# Patient Record
Sex: FEMALE
[De-identification: names, ages, dates, MRNs, and addresses within clinical notes are randomized per-mention and may not be internally consistent; named-entity substitution may affect disease eponyms.]

---

## 2018-12-17 PROBLEM — Z00.00 ENCOUNTER FOR PREVENTIVE HEALTH EXAMINATION: Status: ACTIVE | Noted: 2018-12-17

## 2019-01-07 ENCOUNTER — APPOINTMENT (OUTPATIENT)
Dept: ENDOCRINOLOGY | Facility: CLINIC | Age: 59
End: 2019-01-07

## 2019-01-22 ENCOUNTER — APPOINTMENT (OUTPATIENT)
Dept: ENDOCRINOLOGY | Facility: CLINIC | Age: 59
End: 2019-01-22

## 2019-01-31 ENCOUNTER — APPOINTMENT (OUTPATIENT)
Dept: ENDOCRINOLOGY | Facility: CLINIC | Age: 59
End: 2019-01-31
Payer: MEDICAID

## 2019-01-31 VITALS
HEIGHT: 61.81 IN | DIASTOLIC BLOOD PRESSURE: 52 MMHG | HEART RATE: 81 BPM | BODY MASS INDEX: 20.61 KG/M2 | WEIGHT: 112 LBS | SYSTOLIC BLOOD PRESSURE: 120 MMHG

## 2019-01-31 DIAGNOSIS — Z83.49 FAMILY HISTORY OF OTHER ENDOCRINE, NUTRITIONAL AND METABOLIC DISEASES: ICD-10-CM

## 2019-01-31 PROCEDURE — 99205 OFFICE O/P NEW HI 60 MIN: CPT

## 2019-01-31 NOTE — ASSESSMENT
[FreeTextEntry1] : 57 y/o F pt dx for graves disease and subsequently developed hypothyroidism over 35 years ago. Pt is clinically euthyroid.\par Recent labs from 12/18/18, TSH .13. \par Order thyroid function test and make a judgement of Levothyroxine dose accordingly. \par \par Pt formally dx with DM insipidus >30 years ago in [facility]. Pt admits to becoming severely symptomatic in the context of having limited to no access to Desmopressin in [facility]. \par \par Order complete metabolic profile today. Recommend to take Desmopressin BID. \par \par Return in 6 months. \par \par  [Levothyroxine] : The patient was instructed to take Levothyroxine on an empty stomach, separate from vitamins, and wait at least 30 minutes before eating

## 2019-01-31 NOTE — HISTORY OF PRESENT ILLNESS
[FreeTextEntry1] : 57 y/o F pt, BMI 20.6, officially moved to the  from [facility] last year, dx with grave disease after delivery approximately 37 years ago. Pt treated with radio iodine ablation and subsequently developed hypothyroidism for which she takes thyroid supplementation. One year after dx of grave's disease, pt was dx with DM insipidus 2/2 having symptoms of polyuria; pt was started on Desmopressin. Pt states that she had a pituitary MRI that reports microadenomas approximately 30 years ago. \par \par Pt is currently prescribed Levothyroxine 100 micrograms and Desmopressin .1 mg TID. However, pt relates she tends to spare the use of her Desmopressin to only take 1 pill at night and she takes 2 or 3 when she is not home. \par \par Upon reviewing lab reports from 12/18/18, TSH .13, LDL-C 131, prolactin 12.4, sodium 141 and Vitamin D 19. \par \par Pt is feeling well with no complaints. Pt denies weight loss, palpitations, cold and heat intolerance, headaches and excessive urination. \par

## 2019-01-31 NOTE — PHYSICAL EXAM
[Alert] : alert [No Acute Distress] : no acute distress [Well Nourished] : well nourished [Well Developed] : well developed [Normal Sclera/Conjunctiva] : normal sclera/conjunctiva [EOMI] : extra ocular movement intact [Normal Oropharynx] : the oropharynx was normal [Thyroid Not Enlarged] : the thyroid was not enlarged [No Thyroid Nodules] : there were no palpable thyroid nodules [No Respiratory Distress] : no respiratory distress [No Accessory Muscle Use] : no accessory muscle use [Clear to Auscultation] : lungs were clear to auscultation bilaterally [Normal Rate] : heart rate was normal  [Normal S1, S2] : normal S1 and S2 [Regular Rhythm] : with a regular rhythm [Pedal Pulses Normal] : the pedal pulses are present [No Edema] : there was no peripheral edema [Normal Bowel Sounds] : normal bowel sounds [Post Cervical Nodes] : posterior cervical nodes [Anterior Cervical Nodes] : anterior cervical nodes [Axillary Nodes] : axillary nodes [Normal] : normal and non tender [No Spinal Tenderness] : no spinal tenderness [Spine Straight] : spine straight [No Stigmata of Cushings Syndrome] : no stigmata of cushings syndrome [Normal Gait] : normal gait [Normal Strength/Tone] : muscle strength and tone were normal [No Rash] : no rash [Acanthosis Nigricans] : no acanthosis nigricans [Normal Reflexes] : deep tendon reflexes were 2+ and symmetric [No Tremors] : no tremors [Oriented x3] : oriented to person, place, and time [de-identified] : mild proptosis

## 2019-02-05 ENCOUNTER — OTHER (OUTPATIENT)
Age: 59
End: 2019-02-05

## 2019-02-05 ENCOUNTER — RESULT CHARGE (OUTPATIENT)
Age: 59
End: 2019-02-05

## 2019-02-05 LAB
25(OH)D3 SERPL-MCNC: 30.4 NG/ML
ALBUMIN SERPL ELPH-MCNC: 4.8 G/DL
ALP BLD-CCNC: 115 U/L
ALT SERPL-CCNC: 13 U/L
ANION GAP SERPL CALC-SCNC: 12 MMOL/L
AST SERPL-CCNC: 21 U/L
BILIRUB SERPL-MCNC: 0.3 MG/DL
BUN SERPL-MCNC: 13 MG/DL
CALCIUM SERPL-MCNC: 9.3 MG/DL
CHLORIDE SERPL-SCNC: 99 MMOL/L
CO2 SERPL-SCNC: 26 MMOL/L
CREAT SERPL-MCNC: 0.52 MG/DL
GLUCOSE SERPL-MCNC: 94 MG/DL
POTASSIUM SERPL-SCNC: 4 MMOL/L
PROT SERPL-MCNC: 7.2 G/DL
SODIUM SERPL-SCNC: 137 MMOL/L
T3FREE SERPL-MCNC: 2.61 PG/ML
T4 FREE SERPL-MCNC: 1.5 NG/DL
THYROGLOB AB SERPL-ACNC: <20 IU/ML
THYROPEROXIDASE AB SERPL IA-ACNC: <10 IU/ML
TSH SERPL-ACNC: 0.14 UIU/ML

## 2019-03-28 ENCOUNTER — APPOINTMENT (OUTPATIENT)
Dept: ENDOCRINOLOGY | Facility: CLINIC | Age: 59
End: 2019-03-28
Payer: MEDICAID

## 2019-03-28 VITALS
SYSTOLIC BLOOD PRESSURE: 125 MMHG | HEART RATE: 81 BPM | WEIGHT: 112 LBS | DIASTOLIC BLOOD PRESSURE: 67 MMHG | BODY MASS INDEX: 20.61 KG/M2

## 2019-03-28 PROCEDURE — 82962 GLUCOSE BLOOD TEST: CPT

## 2019-03-28 PROCEDURE — 99215 OFFICE O/P EST HI 40 MIN: CPT | Mod: 25

## 2019-03-28 NOTE — HISTORY OF PRESENT ILLNESS
[FreeTextEntry1] : 12/18/18, TSH 0.13, LDL-C 131, prolactin 12.4, sodium 141 and Vitamin D 19. \par 1/31/19: TSH 0.14, t4 free 1.5, vit d 25,oh 30.4, sodium 137 \par \par 59 y/o F pt, BMI 20.6, officially moved to the  from [facility] last year, dx with grave disease after delivery approximately 37 years ago. Pt treated with radio iodine ablation and subsequently developed hypothyroidism for which she takes thyroid supplementation. One year after dx of grave's disease, pt was dx with DM insipidus 2/2 having symptoms of polyuria; pt was started on Desmopressin. Pt states that she had a pituitary MRI that reports microadenomas approximately 30 years ago. \par \par Pt presents today stating she feels much better, and a lot more calm. Pt denies weight loss, palpitations, cold and heat intolerance, headaches, visual problems and excessive urination.\par \par Current medications: Desmopressin 1 tablet BID, Levothyroxine 88 mcg (down from 100 in the past)

## 2019-03-28 NOTE — PHYSICAL EXAM
[Alert] : alert [No Acute Distress] : no acute distress [Well Nourished] : well nourished [Well Developed] : well developed [Normal Sclera/Conjunctiva] : normal sclera/conjunctiva [EOMI] : extra ocular movement intact [Normal Oropharynx] : the oropharynx was normal [Thyroid Not Enlarged] : the thyroid was not enlarged [No Thyroid Nodules] : there were no palpable thyroid nodules [No Respiratory Distress] : no respiratory distress [Normal Rate] : heart rate was normal  [Pedal Pulses Normal] : the pedal pulses are present [No Edema] : there was no peripheral edema [Normal Bowel Sounds] : normal bowel sounds [No Spinal Tenderness] : no spinal tenderness [Spine Straight] : spine straight [No Stigmata of Cushings Syndrome] : no stigmata of cushings syndrome [Normal Gait] : normal gait [No Rash] : no rash [Normal Reflexes] : deep tendon reflexes were 2+ and symmetric [No Tremors] : no tremors [Oriented x3] : oriented to person, place, and time [No Neck Mass] : no neck mass was observed [Normal Strength/Tone] : muscle strength and tone were normal [Acanthosis Nigricans] : no acanthosis nigricans [de-identified] : mild proptosis  [de-identified] : no tremors

## 2019-03-28 NOTE — ASSESSMENT
[Levothyroxine] : The patient was instructed to take Levothyroxine on an empty stomach, separate from vitamins, and wait at least 30 minutes before eating [FreeTextEntry1] : 59 y/o F pt with \par 1. DM insipidus \par Pt overall feels much better, less anxious and clinically improved; no longer with urinary frequency or urgency. \par No nocturia. Continue with Desmopressin 0.1mg 1 tablet at 8 am and 1 tablet at 8 pm (down from 3 tablets/day in the past). Labs today. \par \par 2. Hypothyroidism s/p grave's disease ablation\par Pt is euthyroid. Continue with 88 mcg.

## 2019-03-28 NOTE — END OF VISIT
[FreeTextEntry3] : All medical record entries made by the Scribe were at my, Dr. Jose Doherty, direction and personally dictated by me on 03/28/2019. I have reviewed the chart and agree that the record accurately reflects my personal performance of the history, physical exam, assessment and plan. I have also personally directed, reviewed and agreed with the chart. \par \par   [>50% of Time Spent on Counseling for ____] : Greater than 50% of the encounter time was spent on counseling for [unfilled] [Time Spent: ___ minutes] : I have spent [unfilled] minutes of face to face time with the patient

## 2019-03-28 NOTE — ADDENDUM
[FreeTextEntry1] : I, Hannah Quinonez, acted soley as a scribe for Dr. Jose Doherty on this date. 03/28/2019.

## 2019-04-10 LAB
ALBUMIN SERPL ELPH-MCNC: 4.6 G/DL
ALP BLD-CCNC: 125 U/L
ALT SERPL-CCNC: 14 U/L
ANION GAP SERPL CALC-SCNC: 13 MMOL/L
AST SERPL-CCNC: 20 U/L
BILIRUB SERPL-MCNC: <0.2 MG/DL
BUN SERPL-MCNC: 18 MG/DL
CALCIUM SERPL-MCNC: 9.7 MG/DL
CHLORIDE SERPL-SCNC: 99 MMOL/L
CO2 SERPL-SCNC: 28 MMOL/L
CREAT SERPL-MCNC: 0.64 MG/DL
GLUCOSE BLDC GLUCOMTR-MCNC: 112
GLUCOSE SERPL-MCNC: 73 MG/DL
POTASSIUM SERPL-SCNC: 4.2 MMOL/L
PROT SERPL-MCNC: 7.4 G/DL
SODIUM SERPL-SCNC: 140 MMOL/L
T4 FREE SERPL-MCNC: 1.4 NG/DL
TSH SERPL-ACNC: 0.21 UIU/ML

## 2019-07-11 ENCOUNTER — CHART COPY (OUTPATIENT)
Age: 59
End: 2019-07-11

## 2019-07-30 ENCOUNTER — OTHER (OUTPATIENT)
Age: 59
End: 2019-07-30

## 2019-08-13 ENCOUNTER — OTHER (OUTPATIENT)
Age: 59
End: 2019-08-13

## 2019-09-27 ENCOUNTER — OTHER (OUTPATIENT)
Age: 59
End: 2019-09-27

## 2019-10-08 ENCOUNTER — APPOINTMENT (OUTPATIENT)
Dept: ENDOCRINOLOGY | Facility: CLINIC | Age: 59
End: 2019-10-08
Payer: MEDICAID

## 2019-10-08 VITALS
BODY MASS INDEX: 21.53 KG/M2 | WEIGHT: 117 LBS | DIASTOLIC BLOOD PRESSURE: 66 MMHG | HEART RATE: 77 BPM | SYSTOLIC BLOOD PRESSURE: 110 MMHG | HEIGHT: 61.81 IN

## 2019-10-08 LAB — GLUCOSE BLDC GLUCOMTR-MCNC: 89

## 2019-10-08 PROCEDURE — 99215 OFFICE O/P EST HI 40 MIN: CPT | Mod: 25

## 2019-10-08 PROCEDURE — 82962 GLUCOSE BLOOD TEST: CPT

## 2019-10-08 RX ORDER — LEVOTHYROXINE SODIUM 0.09 MG/1
88 TABLET ORAL DAILY
Qty: 90 | Refills: 1 | Status: DISCONTINUED | COMMUNITY
Start: 2019-02-05 | End: 2019-10-08

## 2019-10-10 LAB
ALBUMIN SERPL ELPH-MCNC: 4.5 G/DL
ALP BLD-CCNC: 110 U/L
ALT SERPL-CCNC: 11 U/L
ANION GAP SERPL CALC-SCNC: 10 MMOL/L
AST SERPL-CCNC: 18 U/L
BILIRUB SERPL-MCNC: 0.2 MG/DL
BUN SERPL-MCNC: 15 MG/DL
CALCIUM SERPL-MCNC: 9.5 MG/DL
CHLORIDE SERPL-SCNC: 104 MMOL/L
CO2 SERPL-SCNC: 28 MMOL/L
CREAT SERPL-MCNC: 0.74 MG/DL
GLUCOSE SERPL-MCNC: 93 MG/DL
POTASSIUM SERPL-SCNC: 4.1 MMOL/L
PROT SERPL-MCNC: 6.9 G/DL
SODIUM SERPL-SCNC: 142 MMOL/L
T4 FREE SERPL-MCNC: 1.5 NG/DL
TSH SERPL-ACNC: 0.18 UIU/ML

## 2019-10-10 NOTE — REASON FOR VISIT
[Follow-Up: _____] : a [unfilled] follow-up visit [FreeTextEntry1] : diabetes insipidus, secondary hypothyroidism

## 2019-10-10 NOTE — REVIEW OF SYSTEMS
[As Noted in HPI] : as noted in HPI [Recent Weight Gain (___ Lbs)] : recent [unfilled] ~Ulb weight gain [Negative] : Eyes [Recent Weight Loss (___ Lbs)] : no recent weight loss [Palpitations] : no palpitations [Polyuria] : no polyuria [Nocturia] : no nocturia [FreeTextEntry7] : denies GI disturbances

## 2019-10-10 NOTE — PHYSICAL EXAM
[Alert] : alert [No Acute Distress] : no acute distress [Well Nourished] : well nourished [Well Developed] : well developed [Normal Sclera/Conjunctiva] : normal sclera/conjunctiva [EOMI] : extra ocular movement intact [No Neck Mass] : no neck mass was observed [Thyroid Not Enlarged] : the thyroid was not enlarged [No Thyroid Nodules] : there were no palpable thyroid nodules [No Respiratory Distress] : no respiratory distress [Normal Rate] : heart rate was normal  [Pedal Pulses Normal] : the pedal pulses are present [No Edema] : there was no peripheral edema [Normal Bowel Sounds] : normal bowel sounds [Spine Straight] : spine straight [No Stigmata of Cushings Syndrome] : no stigmata of cushings syndrome [Normal Gait] : normal gait [Normal Strength/Tone] : muscle strength and tone were normal [No Rash] : no rash [Normal Reflexes] : deep tendon reflexes were 2+ and symmetric [No Tremors] : no tremors [Oriented x3] : oriented to person, place, and time [Normal Outer Ear/Nose] : the ears and nose were normal in appearance [Acanthosis Nigricans] : no acanthosis nigricans [de-identified] : mild proptosis  [de-identified] : no tremors

## 2019-10-10 NOTE — ASSESSMENT
[Levothyroxine] : The patient was instructed to take Levothyroxine on an empty stomach, separate from vitamins, and wait at least 30 minutes before eating [FreeTextEntry1] : 58 y/o F pt with: \par 1. Hx of hypothyroidism:\par Pt is clinically euthyroid. On 88 mcg of Levothyroxine. \par Will send for TFTs today.\par \par 2. Hx of DM insipidus (dx > 37 yrs ago):\par Pt is on Desmopressin 0.1mg 1 tablet at 8AM and 8PM; she does not have symptoms of polyuria. Recommend pt continue present management. \par \par Return in 4 months. \par \par

## 2019-10-10 NOTE — ADDENDUM
[FreeTextEntry1] : I, Alisa Richardson, acted solely as a scribe for Dr. Jose Doherty on this date. 10/08/2019.

## 2019-10-10 NOTE — END OF VISIT
[FreeTextEntry3] : All medical record entries made by the Scribe were at my, Dr. Jose Doherty, direction and personally dictated by me on 10/08/2019. I have reviewed the chart and agree that the record accurately reflects my personal performance of the history, physical exam, assessment and plan. I have also personally directed, reviewed and agreed with the chart.  [>50% of Time Spent on Counseling for ____] : Greater than 50% of the encounter time was spent on counseling for [unfilled] [Time Spent: ___ minutes] : I have spent [unfilled] minutes of face to face time with the patient

## 2019-10-10 NOTE — HISTORY OF PRESENT ILLNESS
[FreeTextEntry1] : 12/18/18, TSH 0.13, LDL-C 131, prolactin 12.4, sodium 141 and Vitamin D 19. \par 1/31/19: TSH 0.14, t4 free 1.5, vit d 25,oh 30.4, sodium 137 \par 3/28/19: TSH 0.21, Free T4 1.4, s. creat 0.64, Na 140, \par \par 58 y/o F pt with Hx of secondary hypothyroidism 2/2 Grave's disease (dx post delivery ~37 yrs ago; treated by MAURICIO ablation).\par Significant PMHx: DM insipidus (dx 1yr after Grave's disease diagnosis) and microadenoma (dx ~30 yrs ago, as per pt)\par Last PCP visit: in 2018\par \par Pt officially moved to the  from [facility] in 2018, dx with grave disease after delivery approximately 37 years ago. Pt treated with radio iodine ablation and subsequently developed hypothyroidism for which she takes thyroid supplementation. One year after dx of grave's disease, pt was dx with DM insipidus 2/2 having symptoms of polyuria; pt was started on Desmopressin. Pt states that she had a pituitary MRI that reports microadenomas approximately 30 years ago. \par \par 3/28/19\par Pt presents today stating she feels much better, and a lot more calm. Pt denies weight loss, palpitations, cold and heat intolerance, headaches, visual problems and excessive urination.\par \par 10/8/19\par Today pt presents for thyroid f/u, feeling well with no major physical complaints. She notes she moved from [facility].  \par Pt states she takes Desmopressin at 8AM and 8PM, along with Levothyroxine 88mcg. \par Pt gained 5lbs since her last visit. \par Denies polyuria, nocturia, palpitations, GI disturbances, and weight loss.\par \par Current Medications: Desmopressin 0.1mg tablet BID, Levothyroxine 88 mcg (down from 100 in the past)

## 2019-10-17 ENCOUNTER — RX CHANGE (OUTPATIENT)
Age: 59
End: 2019-10-17

## 2020-01-16 ENCOUNTER — APPOINTMENT (OUTPATIENT)
Dept: ENDOCRINOLOGY | Facility: CLINIC | Age: 60
End: 2020-01-16
Payer: MEDICAID

## 2020-01-16 VITALS
WEIGHT: 117 LBS | BODY MASS INDEX: 21.53 KG/M2 | HEART RATE: 98 BPM | HEIGHT: 61.81 IN | DIASTOLIC BLOOD PRESSURE: 52 MMHG | SYSTOLIC BLOOD PRESSURE: 103 MMHG

## 2020-01-16 PROCEDURE — 99214 OFFICE O/P EST MOD 30 MIN: CPT | Mod: 25

## 2020-01-16 PROCEDURE — 82962 GLUCOSE BLOOD TEST: CPT

## 2020-01-17 NOTE — ASSESSMENT
[Levothyroxine] : The patient was instructed to take Levothyroxine on an empty stomach, separate from vitamins, and wait at least 30 minutes before eating [FreeTextEntry1] : 60 y/o F pt with: \par 1. Hx of hypothyroidism: Clinically euthyroid\par Free T4 is high normal and low TSH is 0.18 on 10/2019. Explained to pt the optimal level of TFTs. \par Ordered labs today; will call pt with result and adjustment to her Levothyroxine dose if necessary. \par \par 2. Hx of DM Insipidus (dx > 37 yrs ago):\par Pt is asymptomatic. Denies headache, weakness and excessive urination. \par Recommend pt to continue DDAVP 0.1mg 1 tablet at 8AM and 8PM. \par \par Return in 6 months. \par \par

## 2020-01-17 NOTE — PHYSICAL EXAM
[Alert] : alert [Well Nourished] : well nourished [No Acute Distress] : no acute distress [Normal Sclera/Conjunctiva] : normal sclera/conjunctiva [Well Developed] : well developed [EOMI] : extra ocular movement intact [Normal Outer Ear/Nose] : the ears and nose were normal in appearance [No Neck Mass] : no neck mass was observed [Thyroid Not Enlarged] : the thyroid was not enlarged [No Thyroid Nodules] : there were no palpable thyroid nodules [No Respiratory Distress] : no respiratory distress [Normal Rate] : heart rate was normal  [Pedal Pulses Normal] : the pedal pulses are present [No Edema] : there was no peripheral edema [Normal Bowel Sounds] : normal bowel sounds [Spine Straight] : spine straight [No Stigmata of Cushings Syndrome] : no stigmata of cushings syndrome [Normal Gait] : normal gait [Normal Strength/Tone] : muscle strength and tone were normal [No Rash] : no rash [Normal Reflexes] : deep tendon reflexes were 2+ and symmetric [No Tremors] : no tremors [Oriented x3] : oriented to person, place, and time [de-identified] : no tremors  [de-identified] : mild proptosis  [Acanthosis Nigricans] : no acanthosis nigricans

## 2020-01-17 NOTE — REVIEW OF SYSTEMS
[Negative] : Constitutional [As Noted in HPI] : as noted in HPI [Recent Weight Gain (___ Lbs)] : no recent weight gain [Recent Weight Loss (___ Lbs)] : no recent weight loss [Polyuria] : no polyuria [Nocturia] : no nocturia [Cold Intolerance] : cold tolerant

## 2020-01-17 NOTE — ADDENDUM
[FreeTextEntry1] : I, Frankie Fregoso, acted solely as a scribe for Dr. Jose Doherty on this date. 01/16/2020.

## 2020-01-17 NOTE — END OF VISIT
[>50% of Time Spent on Counseling for ____] : Greater than 50% of the encounter time was spent on counseling for [unfilled] [Time Spent: ___ minutes] : I have spent [unfilled] minutes of face to face time with the patient [FreeTextEntry3] : All medical record entries made by the Scribe were at my, Dr. Jose Doherty, direction and personally dictated by me on 01/16/2020. I have reviewed the chart and agree that the record accurately reflects my personal performance of the history, physical exam, assessment and plan. I have also personally directed, reviewed and agreed with the chart.

## 2020-01-17 NOTE — HISTORY OF PRESENT ILLNESS
[FreeTextEntry1] : - 12/18/18, TSH 0.13, LDL-C 131, prolactin 12.4, sodium 141 and Vitamin D 19. \par - 1/31/19: TSH 0.14, t4 free 1.5, vit d 25,oh 30.4, sodium 137 \par - 3/28/19: TSH 0.21, Free T4 1.4, s. creat 0.64, Na 140\par - 10/8/19: s.creat 0.74, TSH 0.18, Free T4 1.5\par \par 58 y/o F pt with Hx of secondary hypothyroidism s/p MAURICIO ablation for Grave's disease (dx post delivery ~37 yrs ago), currently on Levothyroxine 88mcg. \par Significant PMHx: DM insipidus (dx 1yr after Grave's disease diagnosis) and microadenoma (dx ~30 yrs ago, as per pt)\par Last PCP visit: in 2018\par \par Pt officially moved to the  from [facility] in 2018, dx with grave disease after delivery approximately 37 years ago. Pt treated with radio iodine ablation and subsequently developed hypothyroidism for which she takes thyroid supplementation. One year after dx of grave's disease, pt was dx with DM insipidus 2/2 having symptoms of polyuria; pt was started on Desmopressin. Pt states that she had a pituitary MRI that reports microadenomas approximately 30 years ago. \par \par 01/16/2020\par Pt presents today for endocrine f/u, feeling well with no major physical complaints. \par Denies excessive urination, cold intolerance and weight changes. \par \par Current Medications: Desmopressin 0.1mg tablet BID, Levothyroxine 88 mcg (decreased from 100mcg )

## 2020-02-06 LAB
ALBUMIN SERPL ELPH-MCNC: 4.3 G/DL
ALP BLD-CCNC: 117 U/L
ALT SERPL-CCNC: 9 U/L
ANION GAP SERPL CALC-SCNC: 11 MMOL/L
AST SERPL-CCNC: 20 U/L
BILIRUB SERPL-MCNC: 0.2 MG/DL
BUN SERPL-MCNC: 11 MG/DL
CALCIUM SERPL-MCNC: 9.6 MG/DL
CHLORIDE SERPL-SCNC: 102 MMOL/L
CO2 SERPL-SCNC: 26 MMOL/L
CREAT SERPL-MCNC: 0.69 MG/DL
GLUCOSE BLDC GLUCOMTR-MCNC: 104
GLUCOSE SERPL-MCNC: 77 MG/DL
POTASSIUM SERPL-SCNC: 4.5 MMOL/L
PROT SERPL-MCNC: 7 G/DL
SODIUM SERPL-SCNC: 139 MMOL/L
T4 FREE SERPL-MCNC: 1.5 NG/DL
TSH SERPL-ACNC: 0.14 UIU/ML

## 2020-03-17 RX ORDER — LEVOTHYROXINE SODIUM 0.09 MG/1
88 TABLET ORAL DAILY
Qty: 30 | Refills: 4 | Status: DISCONTINUED | COMMUNITY
Start: 2019-07-30 | End: 2020-03-17

## 2020-07-16 ENCOUNTER — APPOINTMENT (OUTPATIENT)
Dept: ENDOCRINOLOGY | Facility: CLINIC | Age: 60
End: 2020-07-16
Payer: MEDICAID

## 2020-07-16 VITALS
WEIGHT: 114 LBS | SYSTOLIC BLOOD PRESSURE: 130 MMHG | DIASTOLIC BLOOD PRESSURE: 62 MMHG | HEART RATE: 69 BPM | BODY MASS INDEX: 20.98 KG/M2

## 2020-07-16 PROCEDURE — 36415 COLL VENOUS BLD VENIPUNCTURE: CPT

## 2020-07-16 PROCEDURE — 99214 OFFICE O/P EST MOD 30 MIN: CPT | Mod: 25

## 2020-07-16 PROCEDURE — 82962 GLUCOSE BLOOD TEST: CPT
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
## 2020-07-17 NOTE — PHYSICAL EXAM
[Alert] : alert [Normal Sclera/Conjunctiva] : normal sclera/conjunctiva [Normal Outer Ear/Nose] : the ears and nose were normal in appearance [Thyroid Not Enlarged] : the thyroid was not enlarged [No Neck Mass] : no neck mass was observed [Normal Rate] : heart rate was normal [No Thyroid Nodules] : no palpable thyroid nodules [No Edema] : no peripheral edema [Pedal Pulses Normal] : the pedal pulses are present [Spine Straight] : spine straight [Normal Bowel Sounds] : normal bowel sounds [No Stigmata of Cushings Syndrome] : no stigmata of Cushings Syndrome [Normal Gait] : normal gait [No Rash] : no rash [Normal Reflexes] : deep tendon reflexes were 2+ and symmetric [Oriented x3] : oriented to person, place, and time [Acanthosis Nigricans] : no acanthosis nigricans

## 2020-07-17 NOTE — ASSESSMENT
[FreeTextEntry1] : 60 y/o F pt with:\par \par 1. Hx of secondary hypothyroidism: \par Pt is clinically euthyroid. Her diastolic BP is normal. \par No signs and symptoms of any other autoimmune endocrine related disorders. \par Sent TFTs today; will call pt with results\par \par 2. Hx of Diabetes Insipidus: \par Pt is asymptomatic. Denies polyuria and polydypsia. Continue with DDAVP 0.1mg BID.  \par \par Return in: 1 year [Levothyroxine] : The patient was instructed to take Levothyroxine on an empty stomach, separate from vitamins, and wait at least 30 minutes before eating

## 2020-07-17 NOTE — REVIEW OF SYSTEMS
[Recent Weight Loss (___ Lbs)] : recent weight loss: [unfilled] lbs [Negative] : Endocrine [Polyuria] : no polyuria [Polydipsia] : no polydipsia

## 2020-07-17 NOTE — ADDENDUM
[FreeTextEntry1] : I, Frankie Fregoso, acted solely as a scribe for Dr. Jose Doherty on this date. 07/16/2020.

## 2020-07-17 NOTE — END OF VISIT
[FreeTextEntry3] : All medical record entries made by the Scribe were at my, Dr. Jose Doherty, direction and personally dictated by me on 07/16/2020. I have reviewed the chart and agree that the record accurately reflects my personal performance of the history, physical exam, assessment and plan. I have also personally directed, reviewed and agreed with the chart.  [Time Spent: ___ minutes] : I have spent [unfilled] minutes of time on the encounter. [>50% of the face to face encounter time was spent on counseling and/or coordination of care for ___] : Greater than 50% of the face to face encounter time was spent on counseling and/or coordination of care for [unfilled]

## 2020-07-22 LAB
ALBUMIN SERPL ELPH-MCNC: 4.8 G/DL
ALP BLD-CCNC: 109 U/L
ALT SERPL-CCNC: 16 U/L
ANION GAP SERPL CALC-SCNC: 13 MMOL/L
AST SERPL-CCNC: 24 U/L
BILIRUB SERPL-MCNC: 0.2 MG/DL
BUN SERPL-MCNC: 15 MG/DL
CALCIUM SERPL-MCNC: 9.8 MG/DL
CHLORIDE SERPL-SCNC: 107 MMOL/L
CO2 SERPL-SCNC: 25 MMOL/L
CREAT SERPL-MCNC: 0.72 MG/DL
GLUCOSE BLDC GLUCOMTR-MCNC: 93
GLUCOSE SERPL-MCNC: 86 MG/DL
POTASSIUM SERPL-SCNC: 4.3 MMOL/L
PROT SERPL-MCNC: 7.2 G/DL
SODIUM SERPL-SCNC: 145 MMOL/L
T3FREE SERPL-MCNC: 2.04 PG/ML
T4 FREE SERPL-MCNC: 1.2 NG/DL
TSH SERPL-ACNC: 2.39 UIU/ML

## 2021-02-23 ENCOUNTER — APPOINTMENT (OUTPATIENT)
Dept: ENDOCRINOLOGY | Facility: CLINIC | Age: 61
End: 2021-02-23
Payer: MEDICAID

## 2021-02-23 VITALS
SYSTOLIC BLOOD PRESSURE: 122 MMHG | WEIGHT: 115 LBS | BODY MASS INDEX: 21.16 KG/M2 | HEART RATE: 105 BPM | DIASTOLIC BLOOD PRESSURE: 59 MMHG

## 2021-02-23 LAB — GLUCOSE BLDC GLUCOMTR-MCNC: 113

## 2021-02-23 PROCEDURE — 99072 ADDL SUPL MATRL&STAF TM PHE: CPT

## 2021-02-23 PROCEDURE — 82962 GLUCOSE BLOOD TEST: CPT

## 2021-02-23 PROCEDURE — 99214 OFFICE O/P EST MOD 30 MIN: CPT | Mod: 25

## 2021-02-26 ENCOUNTER — APPOINTMENT (OUTPATIENT)
Dept: ENDOCRINOLOGY | Facility: CLINIC | Age: 61
End: 2021-02-26

## 2021-02-26 NOTE — PHYSICAL EXAM
[Alert] : alert [Normal Sclera/Conjunctiva] : normal sclera/conjunctiva [Normal Outer Ear/Nose] : the ears and nose were normal in appearance [No Neck Mass] : no neck mass was observed [Thyroid Not Enlarged] : the thyroid was not enlarged [No Thyroid Nodules] : no palpable thyroid nodules [Normal Rate] : heart rate was normal [No Edema] : no peripheral edema [Pedal Pulses Normal] : the pedal pulses are present [Normal Bowel Sounds] : normal bowel sounds [Spine Straight] : spine straight [No Stigmata of Cushings Syndrome] : no stigmata of Cushings Syndrome [Normal Gait] : normal gait [No Rash] : no rash [Normal Reflexes] : deep tendon reflexes were 2+ and symmetric [Oriented x3] : oriented to person, place, and time [Acanthosis Nigricans] : no acanthosis nigricans

## 2021-02-26 NOTE — REVIEW OF SYSTEMS
[Negative] : Heme/Lymph [As Noted in HPI] : as noted in HPI [Dry Eyes] : dryness [Blurred Vision] : no blurred vision [Palpitations] : no palpitations [Diarrhea] : no diarrhea [Tremors] : no tremors [Insomnia] : no insomnia [FreeTextEntry3] : denies eye tearing

## 2021-02-26 NOTE — ADDENDUM
[FreeTextEntry1] : I, Alisa Richardson, acted solely as a scribe for Dr. Jose Doherty on this date. 02/23/2021.

## 2021-02-26 NOTE — ASSESSMENT
[Levothyroxine] : The patient was instructed to take Levothyroxine on an empty stomach, separate from vitamins, and wait at least 30 minutes before eating [FreeTextEntry1] : 61 y/o F pt with:\par \par 1. Secondary hypothyroidism s/p MAURICIO ablation for Graves disease (dx ~40 yrs ago):\par Since then pt has been on thyroid supplementations. Pt is clinically euthyroid, however, she is c/o eye symptoms. She quit smoking 5 yrs ago and has been taking healthy measures to protect her eyes (in a pt who was previously dx with Graves disease). \par Recommend pt take Levothyroxine 88mcg. Will order TFTs today.\par \par 2. Hx of Diabetes Insipidus: \par Pt does not have HA, polyuria, and is responding well to DDAVP 0.1mg BID. \par \par Return in: March
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
## 2021-03-01 ENCOUNTER — APPOINTMENT (OUTPATIENT)
Dept: ENDOCRINOLOGY | Facility: CLINIC | Age: 61
End: 2021-03-01
Payer: MEDICAID

## 2021-03-01 LAB
ALBUMIN SERPL ELPH-MCNC: 4.4 G/DL
ALP BLD-CCNC: 103 U/L
ALT SERPL-CCNC: 14 U/L
ANION GAP SERPL CALC-SCNC: 12 MMOL/L
AST SERPL-CCNC: 19 U/L
BILIRUB SERPL-MCNC: 0.3 MG/DL
BUN SERPL-MCNC: 20 MG/DL
CALCIUM SERPL-MCNC: 9.2 MG/DL
CHLORIDE SERPL-SCNC: 101 MMOL/L
CO2 SERPL-SCNC: 24 MMOL/L
CREAT SERPL-MCNC: 0.76 MG/DL
GLUCOSE SERPL-MCNC: 86 MG/DL
POTASSIUM SERPL-SCNC: 3.9 MMOL/L
PROT SERPL-MCNC: 7.1 G/DL
SODIUM SERPL-SCNC: 137 MMOL/L
T4 FREE SERPL-MCNC: 1.3 NG/DL
TSH SERPL-ACNC: 2.8 UIU/ML

## 2021-03-01 PROCEDURE — 99443: CPT

## 2021-03-01 NOTE — REASON FOR VISIT
[Home] : at home, [unfilled] , at the time of the visit. [Medical Office: ([facility])___] : at the medical office located in  [Verbal consent obtained from patient] : the patient, [unfilled] [Follow - Up] : a follow-up visit [Hypothyroidism] : hypothyroidism [Other___] : [unfilled]

## 2021-03-03 NOTE — ADDENDUM
[FreeTextEntry1] : I, Alisa Richardson, acted solely as a scribe for Dr. Jose Doherty on this date. 03/01/2021.

## 2021-03-03 NOTE — ASSESSMENT
[Levothyroxine] : The patient was instructed to take Levothyroxine on an empty stomach, separate from vitamins, and wait at least 30 minutes before eating [FreeTextEntry1] : DDAVP

## 2021-03-03 NOTE — END OF VISIT
[FreeTextEntry3] : All medical record entries made by the Scribe were at my, Dr. Jose Doherty, direction and personally dictated by me on 03/01/2021. I have reviewed the chart and agree that the record accurately reflects my personal performance of the history, physical exam, assessment and plan. I have also personally directed, reviewed and agreed with the chart.  [Time Spent: ___ minutes] : I have spent [unfilled] minutes of time on the encounter.
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
## 2022-02-09 ENCOUNTER — RX RENEWAL (OUTPATIENT)
Age: 62
End: 2022-02-09

## 2022-06-28 ENCOUNTER — RX RENEWAL (OUTPATIENT)
Age: 62
End: 2022-06-28

## 2022-06-28 ENCOUNTER — APPOINTMENT (OUTPATIENT)
Dept: ENDOCRINOLOGY | Facility: CLINIC | Age: 62
End: 2022-06-28

## 2022-06-28 PROCEDURE — 99214 OFFICE O/P EST MOD 30 MIN: CPT | Mod: 95

## 2022-06-28 NOTE — REASON FOR VISIT
[Patient] : the patient [Follow - Up] : a follow-up visit [Other___] : [unfilled] [Hypothyroidism] : hypothyroidism [Home] : at home, [unfilled] , at the time of the visit. [Medical Office: ([facility])___] : at the medical office located in  [Verbal consent obtained from patient] : the patient, [unfilled]

## 2022-06-30 NOTE — END OF VISIT
[FreeTextEntry3] : All medical record entries made by the Scribe were at my, Dr. Jose Doherty, direction and personally dictated by me on 06/28/2022. I have reviewed the chart and agree that the record accurately reflects my personal performance of the history, physical exam, assessment and plan. I have also personally, directed, reviewed and agreed with the chart  [Time Spent: ___ minutes] : I have spent [unfilled] minutes of time on the encounter.

## 2022-06-30 NOTE — REVIEW OF SYSTEMS
[Negative] : Heme/Lymph [Headaches] : no headaches [FreeTextEntry2] : No weight changes [de-identified] : No polyuria
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
## 2022-06-30 NOTE — ADDENDUM
[FreeTextEntry1] : I, Jennifer Browning, acted solely as a scribe for Dr. Jose Doherty on this date 06/28/2022

## 2022-07-12 ENCOUNTER — TRANSCRIPTION ENCOUNTER (OUTPATIENT)
Age: 62
End: 2022-07-12

## 2022-07-18 DIAGNOSIS — Z86.39 PERSONAL HISTORY OF OTHER ENDOCRINE, NUTRITIONAL AND METABOLIC DISEASE: ICD-10-CM

## 2022-07-21 DIAGNOSIS — E23.2 DIABETES INSIPIDUS: ICD-10-CM

## 2022-07-21 LAB
ALBUMIN SERPL ELPH-MCNC: 4.4 G/DL
ALP BLD-CCNC: 105 U/L
ALT SERPL-CCNC: 14 U/L
ANION GAP SERPL CALC-SCNC: 16 MMOL/L
AST SERPL-CCNC: 20 U/L
BILIRUB SERPL-MCNC: 0.2 MG/DL
BUN SERPL-MCNC: 16 MG/DL
CALCIUM SERPL-MCNC: 9.4 MG/DL
CHLORIDE SERPL-SCNC: 101 MMOL/L
CO2 SERPL-SCNC: 21 MMOL/L
CREAT SERPL-MCNC: 0.7 MG/DL
EGFR: 97 ML/MIN/1.73M2
GLUCOSE SERPL-MCNC: 66 MG/DL
POTASSIUM SERPL-SCNC: 3.8 MMOL/L
PROT SERPL-MCNC: 7.1 G/DL
SODIUM SERPL-SCNC: 138 MMOL/L
TSH SERPL-ACNC: 2.15 UIU/ML

## 2022-08-09 ENCOUNTER — APPOINTMENT (OUTPATIENT)
Dept: HEART AND VASCULAR | Facility: CLINIC | Age: 62
End: 2022-08-09

## 2022-09-13 ENCOUNTER — TRANSCRIPTION ENCOUNTER (OUTPATIENT)
Age: 62
End: 2022-09-13

## 2022-09-14 ENCOUNTER — TRANSCRIPTION ENCOUNTER (OUTPATIENT)
Age: 62
End: 2022-09-14

## 2022-09-14 RX ORDER — LEVOTHYROXINE SODIUM 0.07 MG/1
75 TABLET ORAL
Qty: 90 | Refills: 1 | Status: ACTIVE | OUTPATIENT
Start: 2020-03-17

## 2022-09-14 RX ORDER — DESMOPRESSIN ACETATE 0.1 MG/1
0.1 TABLET ORAL
Qty: 180 | Refills: 1 | Status: ACTIVE | OUTPATIENT
Start: 2019-02-05

## 2022-09-15 ENCOUNTER — NON-APPOINTMENT (OUTPATIENT)
Age: 62
End: 2022-09-15

## 2022-09-15 ENCOUNTER — TRANSCRIPTION ENCOUNTER (OUTPATIENT)
Age: 62
End: 2022-09-15

## 2022-09-16 ENCOUNTER — TRANSCRIPTION ENCOUNTER (OUTPATIENT)
Age: 62
End: 2022-09-16